# Patient Record
Sex: FEMALE | Race: WHITE
[De-identification: names, ages, dates, MRNs, and addresses within clinical notes are randomized per-mention and may not be internally consistent; named-entity substitution may affect disease eponyms.]

---
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
## 2020-03-06 ENCOUNTER — HOSPITAL ENCOUNTER (OUTPATIENT)
Dept: HOSPITAL 92 - ULT | Age: 20
Discharge: HOME | End: 2020-03-06
Attending: FAMILY MEDICINE
Payer: COMMERCIAL

## 2020-03-06 DIAGNOSIS — K81.9: Primary | ICD-10-CM

## 2020-03-06 PROCEDURE — 93975 VASCULAR STUDY: CPT

## 2020-03-06 NOTE — ULT
ULTRASOUND ABDOMEN:

 

HISTORY: 

Abdominal pain.

 

FINDINGS: 

The liver, spleen, gallbladder, kidneys, and visualized portions of the aorta and IVC appear normal. 
 The 1.2 cm solid-appearing mass with flow between the spleen and the left kidney corresponds to the 
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  No free fluid is seen.  The common duct measures
 2 mm in diameter.  

 

IMPRESSION: 

Normal exam.

 

POS: [facility]